# Patient Record
Sex: FEMALE | Race: WHITE | Employment: UNEMPLOYED
[De-identification: names, ages, dates, MRNs, and addresses within clinical notes are randomized per-mention and may not be internally consistent; named-entity substitution may affect disease eponyms.]

---
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
## 2024-07-08 ENCOUNTER — HOSPITAL ENCOUNTER (EMERGENCY)
Age: 24
Discharge: HOME | End: 2024-07-08
Attending: EMERGENCY MEDICINE

## 2024-07-08 VITALS
DIASTOLIC BLOOD PRESSURE: 68 MMHG | TEMPERATURE: 97.8 F | BODY MASS INDEX: 20.98 KG/M2 | HEART RATE: 82 BPM | OXYGEN SATURATION: 100 % | WEIGHT: 114 LBS | HEIGHT: 62 IN | SYSTOLIC BLOOD PRESSURE: 111 MMHG | RESPIRATION RATE: 18 BRPM

## 2024-07-08 DIAGNOSIS — R09.81 COMPLAINT OF NASAL CONGESTION: Primary | ICD-10-CM

## 2024-07-08 PROCEDURE — 99282 EMERGENCY DEPT VISIT SF MDM: CPT

## 2024-07-08 ASSESSMENT — ENCOUNTER SYMPTOMS
VOMITING: 0
RHINORRHEA: 0
SORE THROAT: 0
SINUS PAIN: 0
NAUSEA: 0
COUGH: 0

## 2024-07-08 ASSESSMENT — PAIN - FUNCTIONAL ASSESSMENT: PAIN_FUNCTIONAL_ASSESSMENT: NONE - DENIES PAIN

## 2024-07-08 NOTE — ED PROVIDER NOTES
EMERGENCY DEPARTMENT ENCOUNTER    Pt Name: Priscilla Wallis  MRN: [id number]  Birthdate 2000  Date of evaluation: 7/8/24  CHIEF COMPLAINT       Chief Complaint   Patient presents with    Nasal Congestion     Been going on for a few months, worse the last few weeks.     HISTORY OF PRESENT ILLNESS   Presenting with chief complaint of congestion and some difficulty breathing out of her right nare.  She said that this has been going on for months.  If she lays on her right side, within minutes she has difficulty breathing from that side of her nose.  She denies any other symptoms including sinus pain, fever, chills, throat pain, cough, earache.    The history is provided by the patient and a parent.           REVIEW OF SYSTEMS     Review of Systems   Constitutional:  Negative for chills and fever.   HENT:  Positive for congestion. Negative for ear pain, rhinorrhea, sinus pain and sore throat.    Eyes:  Negative for visual disturbance.   Respiratory:  Negative for cough.    Gastrointestinal:  Negative for nausea and vomiting.   Musculoskeletal:  Negative for myalgias.   Neurological:  Negative for light-headedness and headaches.     PASTMEDICAL HISTORY   History reviewed. No pertinent past medical history.  Past Problem List  Patient Active Problem List   Diagnosis Code    Vaccine refused by parent- HPV Z28.82     SURGICAL HISTORY     History reviewed. No pertinent surgical history.  CURRENT MEDICATIONS       Previous Medications    ACETAMINOPHEN (TYLENOL) 325 MG TABLET    Take 650 mg by mouth every 6 hours as needed for Pain    IBUPROFEN (ADVIL;MOTRIN) 200 MG TABLET    Take 200 mg by mouth every 6 hours as needed for Pain     ALLERGIES     has No Known Allergies.  FAMILY HISTORY     She indicated that her mother is alive. She indicated that her father is alive.     SOCIAL HISTORY       Social History     Tobacco Use    Smoking status: Never    Smokeless tobacco: Never   Substance Use Topics    Drug use: No